# Patient Record
Sex: FEMALE | HISPANIC OR LATINO
[De-identification: names, ages, dates, MRNs, and addresses within clinical notes are randomized per-mention and may not be internally consistent; named-entity substitution may affect disease eponyms.]

---

## 2023-06-12 PROBLEM — Z00.00 ENCOUNTER FOR PREVENTIVE HEALTH EXAMINATION: Status: ACTIVE | Noted: 2023-06-12

## 2023-06-13 ENCOUNTER — APPOINTMENT (OUTPATIENT)
Dept: NEUROSURGERY | Facility: CLINIC | Age: 58
End: 2023-06-13
Payer: MEDICAID

## 2023-06-13 VITALS
OXYGEN SATURATION: 100 % | BODY MASS INDEX: 30.18 KG/M2 | HEART RATE: 78 BPM | WEIGHT: 164 LBS | TEMPERATURE: 97.3 F | HEIGHT: 62 IN | DIASTOLIC BLOOD PRESSURE: 79 MMHG | SYSTOLIC BLOOD PRESSURE: 131 MMHG

## 2023-06-13 DIAGNOSIS — Z78.9 OTHER SPECIFIED HEALTH STATUS: ICD-10-CM

## 2023-06-13 DIAGNOSIS — Z80.1 FAMILY HISTORY OF MALIGNANT NEOPLASM OF TRACHEA, BRONCHUS AND LUNG: ICD-10-CM

## 2023-06-13 DIAGNOSIS — Z87.891 PERSONAL HISTORY OF NICOTINE DEPENDENCE: ICD-10-CM

## 2023-06-13 DIAGNOSIS — Z86.69 PERSONAL HISTORY OF OTHER DISEASES OF THE NERVOUS SYSTEM AND SENSE ORGANS: ICD-10-CM

## 2023-06-13 DIAGNOSIS — D35.2 BENIGN NEOPLASM OF PITUITARY GLAND: ICD-10-CM

## 2023-06-13 DIAGNOSIS — Z86.018 PERSONAL HISTORY OF OTHER BENIGN NEOPLASM: ICD-10-CM

## 2023-06-13 PROCEDURE — 99204 OFFICE O/P NEW MOD 45 MIN: CPT

## 2023-06-13 RX ORDER — AMITRIPTYLINE HYDROCHLORIDE 75 MG/1
TABLET, FILM COATED ORAL
Refills: 0 | Status: ACTIVE | COMMUNITY

## 2023-06-13 RX ORDER — SUMATRIPTAN 50 MG/1
TABLET, FILM COATED ORAL
Refills: 0 | Status: ACTIVE | COMMUNITY

## 2023-06-14 ENCOUNTER — NON-APPOINTMENT (OUTPATIENT)
Age: 58
End: 2023-06-14

## 2023-06-14 LAB
FSH SERPL-MCNC: 123 IU/L
GH SERPL-MCNC: 8.7 NG/ML
LH SERPL-ACNC: 57.5 IU/L
PROLACTIN SERPL-MCNC: 8.1 NG/ML

## 2023-06-16 ENCOUNTER — NON-APPOINTMENT (OUTPATIENT)
Age: 58
End: 2023-06-16

## 2023-06-16 LAB — IGF BP1 SERPL-MCNC: 151 NG/ML

## 2023-06-20 NOTE — ASSESSMENT
[FreeTextEntry1] : Mrs. Leroy is a 58 y/o, female, with a 3.7 x 3.2 mm hypoenhancing focus right lateral margin pituitary gland illustrating pituitary microadenoma. \par - Pituitary labs ordered to r/o abnormal hormone levels. \par - Pt to establish with endocrine for evaluation and tx of pituitary adenoma conservatively\par - Repeat MRI in one year to r/o growth\par - F/u with neuro for management of HA and vertigo\par - No neurosurgical intervention recommended at this time.

## 2023-06-20 NOTE — REVIEW OF SYSTEMS
[Negative] : Heme/Lymph [As Noted in HPI] : as noted in HPI [Vertigo] : vertigo [Tension Headache] : tension-type headaches [de-identified] : pituitary adenoma

## 2023-06-20 NOTE — PHYSICAL EXAM
[General Appearance - Alert] : alert [General Appearance - In No Acute Distress] : in no acute distress [Oriented To Time, Place, And Person] : oriented to person, place, and time [Affect] : the affect was normal [Impaired Insight] : insight and judgment were intact [Person] : oriented to person [Place] : oriented to place [Time] : oriented to time [Short Term Intact] : short term memory intact [Remote Intact] : remote memory intact [Span Intact] : the attention span was normal [Concentration Intact] : normal concentrating ability [Fluency] : fluency intact [Comprehension] : comprehension intact [Current Events] : adequate knowledge of current events [Past History] : adequate knowledge of personal past history [Vocabulary] : adequate range of vocabulary [Cranial Nerves Optic (II)] : visual acuity intact bilaterally,  pupils equal round and reactive to light [Cranial Nerves Oculomotor (III)] : extraocular motion intact [Cranial Nerves Trigeminal (V)] : facial sensation intact symmetrically [Cranial Nerves Facial (VII)] : face symmetrical [Cranial Nerves Vestibulocochlear (VIII)] : hearing was intact bilaterally [Cranial Nerves Glossopharyngeal (IX)] : tongue and palate midline [Cranial Nerves Accessory (XI - Cranial And Spinal)] : head turning and shoulder shrug symmetric [Cranial Nerves Hypoglossal (XII)] : there was no tongue deviation with protrusion [Motor Tone] : muscle tone was normal in all four extremities [Motor Strength] : muscle strength was normal in all four extremities [No Muscle Atrophy] : normal bulk in all four extremities [Sensation Tactile Decrease] : light touch was intact [Abnormal Walk] : normal gait [Balance] : balance was intact [2+] : Patella left 2+ [Past-pointing] : there was no past-pointing [Tremor] : no tremor present

## 2023-06-20 NOTE — HISTORY OF PRESENT ILLNESS
[FreeTextEntry1] : pituitary adenoma  [de-identified] : Mrs. Leroy is a 56 y/o, female, with a hx of vertigo, and HA, here to establish care as a new patient due to recently diagnosed pituitary adenoma. She underwent MRI brain due to unresolved vertigo and HA, during workup a 3.7 x 3.2 mm  focus right lateral margin pituitary gland, compatible with microadenoma illustrated. She denies any change in vision, abnormal weight loss/ gain, seizures, weakness, galactorrhea, n/v, SOB, CP. She is scheduled to see endocrinologist 07/23. No pituitary labs have been performed.

## 2023-06-20 NOTE — RESULTS/DATA
[FreeTextEntry1] : COMPARISON: None.\par \par FINDINGS:\par The pituitary gland demonstrates a superior to inferior dimension of 6.2 mm. There is homogeneous T2 signal hyperintensity within the gland. After contrast administration there is a 3.7 mm x 3.2 mm focus of hypoenhancement in the right lateral aspect of the gland (series 9 image 9, series 8 image 8). The sella cistern and optic chiasm have normal appearance. The adjacent cavernous sinuses are unremarkable. \par \par Axial T2 FLAIR imaging of the brain reveals several nonspecific periventricular and subcortical white matter foci.\par \par IMPRESSION:\par \par 1.   Findings suspicious for a 3.7 x 3.2 mm hypoenhancing focus right lateral margin pituitary gland, compatible with microadenoma.\par \par 2.  Few nonspecific microvascular ischemic changes on FLAIR imaging

## 2023-06-27 ENCOUNTER — APPOINTMENT (OUTPATIENT)
Dept: NEUROSURGERY | Facility: CLINIC | Age: 58
End: 2023-06-27
Payer: MEDICAID

## 2023-06-27 VITALS
TEMPERATURE: 98.3 F | BODY MASS INDEX: 30.18 KG/M2 | HEART RATE: 89 BPM | DIASTOLIC BLOOD PRESSURE: 66 MMHG | HEIGHT: 62 IN | SYSTOLIC BLOOD PRESSURE: 121 MMHG | OXYGEN SATURATION: 100 % | WEIGHT: 164 LBS

## 2023-06-27 PROCEDURE — 99213 OFFICE O/P EST LOW 20 MIN: CPT

## 2023-06-29 NOTE — REASON FOR VISIT
[Follow-Up: _____] : a [unfilled] follow-up visit [FreeTextEntry1] : 6/27/23: Today she return for review of Lab results. She is doing well.\par \par On 6/13/23: .Mrs. Leroy is a 58 y/o, female, with a hx of vertigo, and HA, here to establish care as a new patient due to recently diagnosed pituitary adenoma. She underwent MRI brain due to unresolved vertigo and HA, during workup a 3.7 x 3.2 mm focus right lateral margin pituitary gland, compatible with microadenoma illustrated. She denies any change in vision, abnormal weight loss/ gain, seizures, weakness, galactorrhea, n/v, SOB, CP. She is scheduled to see endocrinologist 07/23. No pituitary labs have been performed. \par

## 2023-06-29 NOTE — ASSESSMENT
[FreeTextEntry1] : Ms. ROMINA DAMIAN is presenting \par The recommendation is for the following:\par Imaging evaluation shows evidence of \par \par Plan:\par Lab results normal\par \par Follow up in one year with New Pituitary MRI .\par Continue follow up with endocrinologist.\par \par I, Dr.Jamie Cuadra, evaluated the patient with the nurse practitioner Christian Javier and established the plan of care. I personally discuss this patient with the nurse practitioner at the time of the visit. I agree with the assessment and plan as written, unless noted below.\par \par \par \par \par

## 2023-06-29 NOTE — PHYSICAL EXAM
[General Appearance - Alert] : alert [General Appearance - In No Acute Distress] : in no acute distress [Oriented To Time, Place, And Person] : oriented to person, place, and time [Impaired Insight] : insight and judgment were intact [Affect] : the affect was normal [Person] : oriented to person [Place] : oriented to place [Time] : oriented to time [Short Term Intact] : short term memory intact [Remote Intact] : remote memory intact [Span Intact] : the attention span was normal [Concentration Intact] : normal concentrating ability [Fluency] : fluency intact [Comprehension] : comprehension intact [Current Events] : adequate knowledge of current events [Past History] : adequate knowledge of personal past history [Vocabulary] : adequate range of vocabulary [Cranial Nerves Optic (II)] : visual acuity intact bilaterally,  pupils equal round and reactive to light [Cranial Nerves Trigeminal (V)] : facial sensation intact symmetrically [Cranial Nerves Oculomotor (III)] : extraocular motion intact [Cranial Nerves Facial (VII)] : face symmetrical [Cranial Nerves Vestibulocochlear (VIII)] : hearing was intact bilaterally [Cranial Nerves Glossopharyngeal (IX)] : tongue and palate midline [Cranial Nerves Hypoglossal (XII)] : there was no tongue deviation with protrusion [Cranial Nerves Accessory (XI - Cranial And Spinal)] : head turning and shoulder shrug symmetric [Motor Tone] : muscle tone was normal in all four extremities [Motor Strength] : muscle strength was normal in all four extremities [No Muscle Atrophy] : normal bulk in all four extremities [Sensation Tactile Decrease] : light touch was intact [Abnormal Walk] : normal gait [Balance] : balance was intact [2+] : Patella left 2+ [] : no respiratory distress [Heart Rate And Rhythm] : heart rate was normal and rhythm regular [Past-pointing] : there was no past-pointing [Tremor] : no tremor present

## 2023-07-07 ENCOUNTER — NON-APPOINTMENT (OUTPATIENT)
Age: 58
End: 2023-07-07

## 2023-07-07 LAB
CORTICOSTEROID BIND GLOBULIN: 2.5 MG/DL
CORTIS SERPL-MCNC: 13 UG/DL
CORTISOL, FREE: 0.87 UG/DL
PFCX: 6.7 %